# Patient Record
(demographics unavailable — no encounter records)

---

## 2025-01-25 NOTE — ASSESSMENT
[Diabetes Foot Care] : diabetes foot care [Long Term Vascular Complications] : long term vascular complications of diabetes [Carbohydrate Consistent Diet] : carbohydrate consistent diet [Importance of Diet and Exercise] : importance of diet and exercise to improve glycemic control, achieve weight loss and improve cardiovascular health [Exercise/Effect on Glucose] : exercise/effect on glucose [Retinopathy Screening] : Patient was referred to ophthalmology for retinopathy screening [Weight Loss] : weight loss [Diabetic Medications] : Risks and benefits of diabetic medications were discussed [Levothyroxine] : The patient was instructed to take Levothyroxine on an empty stomach, separate from vitamins, and wait at least 30 minutes before eating [FreeTextEntry1] : 63yo F w/ hx of DLBCL, LLUVIA, Obesity, DM2, Graves dx s/p GIBSON w/ post ablative hypothyroidism presenting for follow up visit.  #DM2, controlled  #Hx of Obesity - BMI as high as 35 in the past >> BMI now 26.79 has mild neuropathy symptoms  - off Metformin since March 2024  - Transition from Ozempic 2mg sc weekly to Mounjaro as patient feels Ozempic no longer effective for further weight loss, BMI at 26.79, would like to lose another 10-15lbs and weight loss has plateaued despite continued dietary & lifestyle efforts. Reports no hx of MTC/Pancreatitis, No retinopathy and no gallbladder issues.  - Risks and benefits of incretin based tx/medications were discussed. No hx of pancreatitis, medullary thyroid cancer or MEN syndrome in self or family, No hx of retinopathy. Discussed side effects of GI upset and association with the aforementioned issues.  - Does not have any glucose data with her today - Unable to check A1c in office today as out  of cartridges; check serum A1c along with CMP, Lipids, UACR  - reports AM glucose levels usually 120s or lower. No hypoglycemia.  - SMBG 1-2 x per day, fasting and other times of the day in a staggered manner; keep a log to bring along to next visit - Reviewed with patient BG targets for fasting (80-130mg/dL), premeal (<140mg/dL), 2 hours postprandial (< 180mg/dL) as well as hypoglycemia (<70mg/dL) signs/symptoms/risks & management. - Counseled patient on Carb Consistent Diet - Encouraged Regular Physical Activity as tolerated, aim for 30mins per day/5 days per week.  - Recommended yearly foot exams and home foot precautions. Continue podiatry follow up. UTD  - Recommended at least yearly ophthalmology exams or as recommended by ophtho. UTD, last visit in Feb 2024, no retinopathy per patient.  - dietary/lifestyle changes encouraged   #Postablative Hypothyroidism  #Hx of Graves disease s/p GIBSON  - clinically euthyroid  - check TSH, Free T4 - dose recently adjusted 3 weeks ago - continue LT4 125 mcg daily (dose increased 3 weeks ago by PCP per patient after labs done - does not have available for review), to be taken on an empty stomach at least 1 hour apart from food/other meds, 4hours apart from Ca/Fe/MVI  #HLD LDL goal < 70 LDL 42, HDL 58, , TG 64 in March 2024  - Not on Statin - repeat lipids   #HTN BP goal < 130/80 no prior known nephropathy  - currently on metoprolol succinate 100mg daily & amlodipine 10mg daily, managed by PCP  - UACR wnl in March 2024 - repeat UACR  - not on ACEi/ARB/SGLT2i    RTC in 6 months.     Patient verbalized understanding of recommendations and agrees to plan as detailed above.

## 2025-01-25 NOTE — PHYSICAL EXAM
[Alert] : alert [Well Nourished] : well nourished [No Acute Distress] : no acute distress [Well Developed] : well developed [Normal Voice/Communication] : normal voice communication [Normal Sclera/Conjunctiva] : normal sclera/conjunctiva [EOMI] : extra ocular movement intact [No Proptosis] : no proptosis [Normal Outer Ear/Nose] : the ears and nose were normal in appearance [Normal Hearing] : hearing was normal [Normal Oropharynx] : the oropharynx was normal [Supple] : the neck was supple [Thyroid Not Enlarged] : the thyroid was not enlarged [No Thyroid Nodules] : no palpable thyroid nodules [No Respiratory Distress] : no respiratory distress [No Accessory Muscle Use] : no accessory muscle use [Normal Rate and Effort] : normal respiratory rate and effort [Normal Rate] : heart rate was normal [Regular Rhythm] : with a regular rhythm [No Edema] : no peripheral edema [Pedal Pulses Normal] : the pedal pulses are present [Not Tender] : non-tender [Not Distended] : not distended [Soft] : abdomen soft [Normal Anterior Cervical Nodes] : no anterior cervical lymphadenopathy [No Spinal Tenderness] : no spinal tenderness [Spine Straight] : spine straight [No Stigmata of Cushings Syndrome] : no stigmata of Cushings Syndrome [Normal Gait] : normal gait [No Clubbing, Cyanosis] : no clubbing  or cyanosis of the fingernails [No Joint Swelling] : no joint swelling seen [Normal Strength/Tone] : muscle strength and tone were normal [No Rash] : no rash [No Tremors] : no tremors [Oriented x3] : oriented to person, place, and time [Normal Insight/Judgement] : insight and judgment were intact [Acanthosis Nigricans] : no acanthosis nigricans [Foot Ulcers] : no foot ulcers [Hirsutism] : no hirsutism

## 2025-01-25 NOTE — REVIEW OF SYSTEMS
[Decreased Appetite] : decreased appetite [Dry Skin] : dry skin [Negative] : Psychiatric [Fatigue] : no fatigue [Recent Weight Gain (___ Lbs)] : no recent weight gain [Recent Weight Loss (___ Lbs)] : no recent weight loss [Fever] : no fever [Chills] : no chills [Visual Field Defect] : no visual field defect [Blurred Vision] : no blurred vision [Dysphagia] : no dysphagia [Neck Pain] : no neck pain [Dysphonia] : no dysphonia [Chest Pain] : no chest pain [Palpitations] : no palpitations [Lower Ext Edema] : no lower extremity edema [Shortness Of Breath] : no shortness of breath [Cough] : no cough [Nausea] : no nausea [Constipation] : no constipation [Abdominal Pain] : no abdominal pain [Vomiting] : no vomiting [Diarrhea] : no diarrhea [Polyuria] : no polyuria [Irregular Menses] : regular menses [Hair Loss] : no hair loss [Headaches] : no headaches [Dizziness] : no dizziness [Tremors] : no tremors [Pain/Numbness of Digits] : no pain/numbness of digits [Depression] : no depression [Polydipsia] : no polydipsia [Cold Intolerance] : no cold intolerance [Heat Intolerance] : no heat intolerance [FreeTextEntry6] : not using CPAP regularly, feels mask is bothersome  [FreeTextEntry9] : recent fracture of L. foot

## 2025-01-25 NOTE — REVIEW OF SYSTEMS
[Decreased Appetite] : decreased appetite [Dry Skin] : dry skin [Negative] : Psychiatric [Recent Weight Gain (___ Lbs)] : no recent weight gain [Fatigue] : no fatigue [Recent Weight Loss (___ Lbs)] : no recent weight loss [Fever] : no fever [Chills] : no chills [Visual Field Defect] : no visual field defect [Blurred Vision] : no blurred vision [Dysphagia] : no dysphagia [Neck Pain] : no neck pain [Dysphonia] : no dysphonia [Chest Pain] : no chest pain [Palpitations] : no palpitations [Lower Ext Edema] : no lower extremity edema [Shortness Of Breath] : no shortness of breath [Cough] : no cough [Nausea] : no nausea [Constipation] : no constipation [Abdominal Pain] : no abdominal pain [Vomiting] : no vomiting [Diarrhea] : no diarrhea [Polyuria] : no polyuria [Irregular Menses] : regular menses [Hair Loss] : no hair loss [Headaches] : no headaches [Dizziness] : no dizziness [Tremors] : no tremors [Pain/Numbness of Digits] : no pain/numbness of digits [Depression] : no depression [Polydipsia] : no polydipsia [Cold Intolerance] : no cold intolerance [Heat Intolerance] : no heat intolerance [FreeTextEntry6] : not using CPAP regularly, feels mask is bothersome  [FreeTextEntry9] : recent fracture of L. foot 143.9

## 2025-01-25 NOTE — HISTORY OF PRESENT ILLNESS
[FreeTextEntry1] : 63yo F w/ hx of DLBCL, LLUVIA, Obesity, DM2, Graves dx s/p GIBSON w/ post ablative hypothyroidism presenting for follow up visit.   Former patient of Dr. Tia Allred.  Last visit in July 2024.   JANUARY 2025: Feels weight loss has plateaued on Ozempic 2mg weekly. Would like to transition to Mounjaro. Has a friend who is losing weight on Mounjaro and would like to try this.  Reports her appetite is controlled.  reports recent a1c was reported to be low. Does not have recent labs with her.  reports PCP checked labs 3 weeks ago and also increased levothyroxine to 125mcg daily for the past 3 weeks.  Has been on higher dose for 3 weeks.  Reports she has BMs every 2 days, how she is normally. Feeling very cold lately she says. No hair/skin changes. Energy levels are good. Sleeping okay, sometimes does not sleep well if she has a lot on her mind. Weight down ~ 5-6lbs since last visit. Now trying to exercise more, using elliptical and treadmill.   JULY 2024:  Had a slip/fall on stairs early 2024, resulted in fracture of L. foot. Going to PT now 3x per week. Feels her exercise/activity limited 2/2 to this although foot is now healed well.    ==========================================================================================  THYROID HISTORY:  Graves s/p GIBSON in May 2015: uptake and scan showed 62% uptake. US thyroid showed multinodular goiter. TSH R and TPO Abs positive.  Last U/S 11/16 showed no discrete nodules. Showed 1.7 cm normal appearing R cervical Level 2 nodule. TFTs checked in July 2023: TSH 0.15, FT4 2.0. Had been on LT4 137mcg daily at that time.  Dose was subsequently reduced to 125mcg daily. No recent labs.  TFTs checked after last visit in March 2024 w/ elevated FT4, dose adjusted to LT4 112mcg daily.   DM2: A1c 6.4% June 2020. Was started on metformin 500 mg bid mid 2020. Discontinued in March 2024 when she started Ozempic for added weight loss benefit. Appetite more suppressed since starting ozempic but has not been losing weight.  Currently on   A1c has been as high as 6.7% going back to April 2019.  A1c 6.0% in July 2023 >> 5.9% in March 2024 >> 5.8% in July 2024.  No CAD/CVA. No nephropathy. diet- fruits, a slice of bread, roti/veggies with dinner. Reduced appetite, however, consuming mostly carbs/fruits. No soda/juice. No concentrated sweets.  Was walking before, not much since breaking her foot.  Optho - Feb 2024, had dilated eye exam, reports no retinopathy. Has some mildly increased eye pressure. Has an appt coming up.  Podiatry - following with podiatry, has neuropathy, now completely resolved. No ulcers/wounds.   Obesity - had been on Phentermine 15mg daily from March 2023- Aug 2023; dose increased to 30mg daily in Sept 2023. At last visit BMI 27. Reported she has been experiencing dry mouth with this medication. No appetite, eating very little on this med. Had not been taking it for ~3 weeks at that time as she did not feel it was still working. No palpitations.  After March 2024 visit, patient was started on Ozempic. Dose has been slowly titrated up since that visit.  Ozempic dose was increased to 2mg weekly, first dose was on July 8th. Tolerating well. Has not been losing weight on lower doses. Feels appetite is suppressed and eating less but reports she feels weight loss has been stagnant as her activity levels have decreased significantly. Trying to be more active now.   HTN - metoprolol succinate 100mg daily, amlodipine 10mg daily   has dm2 in family, parents & brothers. no pancreatitis/mtc.  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 , lives with spouse/family.  no tobacco, no drug use.  ETOH once in a while on special occasion.

## 2025-07-12 NOTE — HISTORY OF PRESENT ILLNESS
[FreeTextEntry1] : 65yo F w/ hx of DLBCL, LLUVIA, Obesity, DM2, Graves dx s/p GIBSON w/ post ablative hypothyroidism presenting for follow up visit.   Former patient of Dr. Tia Allred.  Last visit in July 2024.   JULY 2025 wants to increase mounjaro dose, weight loss has plateaued on Mounjaro 2.5mg.  Sugars controlled, not checking consistently. Still has not been exercising much.   JANUARY 2025: Feels weight loss has plateaued on Ozempic 2mg weekly. Would like to transition to Mounjaro. Has a friend who is losing weight on Mounjaro and would like to try this.  Reports her appetite is controlled.  reports recent a1c was reported to be low. Does not have recent labs with her.  reports PCP checked labs 3 weeks ago and also increased levothyroxine to 125mcg daily for the past 3 weeks.  Has been on higher dose for 3 weeks.  Reports she has BMs every 2 days, how she is normally. Feeling very cold lately she says. No hair/skin changes. Energy levels are good. Sleeping okay, sometimes does not sleep well if she has a lot on her mind. Weight down ~ 5-6lbs since last visit. Now trying to exercise more, using elliptical and treadmill.   JULY 2024:  Had a slip/fall on stairs early 2024, resulted in fracture of L. foot. Going to PT now 3x per week. Feels her exercise/activity limited 2/2 to this although foot is now healed well.    ==========================================================================================  THYROID HISTORY:  Graves s/p GIBSON in May 2015: uptake and scan showed 62% uptake. US thyroid showed multinodular goiter. TSH R and TPO Abs positive.  Last U/S 11/16 showed no discrete nodules. Showed 1.7 cm normal appearing R cervical Level 2 nodule. TFTs checked in July 2023: TSH 0.15, FT4 2.0. Had been on LT4 137mcg daily at that time.  Dose was subsequently reduced to 125mcg daily. No recent labs.  TFTs checked  in March 2024 w/ elevated FT4, dose adjusted to LT4 112mcg daily.   DM2: A1c 6.4% June 2020. Was started on metformin 500 mg bid mid 2020. Discontinued in March 2024 when she started Ozempic for added weight loss benefit. Appetite more suppressed since starting ozempic but has not been losing weight. Started on Mounjaro 2.5mg weekly in Jan 2025.   A1c has been as high as 6.7% going back to April 2019.  A1c 6.0% in July 2023 >> 5.9% in March 2024 >> 5.8% in July 2024 >> 5.7% in July 2025   No CAD/CVA. No nephropathy. diet- fruits, a slice of bread, roti/veggies with dinner. Reduced appetite, however, consuming mostly carbs/fruits. No soda/juice. No concentrated sweets.  Was walking before, not much since breaking her foot.  Optho - Feb 2024, had dilated eye exam, reports no retinopathy. Has some mildly increased eye pressure. Has an appt coming up.  Podiatry - following with podiatry, has neuropathy, now completely resolved. No ulcers/wounds.   Obesity - had been on Phentermine 15mg daily from March 2023- Aug 2023; dose increased to 30mg daily in Sept 2023. At last visit BMI 27. Reported she has been experiencing dry mouth with this medication. No appetite, eating very little on this med. Had not been taking it for ~3 weeks at that time as she did not feel it was still working. No palpitations.  After March 2024 visit, patient was started on Ozempic. Dose has been slowly titrated up since that visit.  Ozempic dose was increased to 2mg weekly, first dose was on July 8th. Tolerating well. Has not been losing weight on lower doses. Feels appetite is suppressed and eating less but reports she feels weight loss has been stagnant as her activity levels have decreased significantly. Now on Mounjaro.   HTN - metoprolol succinate 100mg daily, amlodipine 10mg daily   has dm2 in family, parents & brothers. no pancreatitis/mtc.  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 , lives with spouse/family.  no tobacco, no drug use.  ETOH once in a while on special occasion.

## 2025-07-12 NOTE — HISTORY OF PRESENT ILLNESS
[FreeTextEntry1] : 63yo F w/ hx of DLBCL, LLUVIA, Obesity, DM2, Graves dx s/p GIBSON w/ post ablative hypothyroidism presenting for follow up visit.   Former patient of Dr. Tia Allred.  Last visit in July 2024.   JULY 2025 wants to increase mounjaro dose, weight loss has plateaued on Mounjaro 2.5mg.  Sugars controlled, not checking consistently. Still has not been exercising much.   JANUARY 2025: Feels weight loss has plateaued on Ozempic 2mg weekly. Would like to transition to Mounjaro. Has a friend who is losing weight on Mounjaro and would like to try this.  Reports her appetite is controlled.  reports recent a1c was reported to be low. Does not have recent labs with her.  reports PCP checked labs 3 weeks ago and also increased levothyroxine to 125mcg daily for the past 3 weeks.  Has been on higher dose for 3 weeks.  Reports she has BMs every 2 days, how she is normally. Feeling very cold lately she says. No hair/skin changes. Energy levels are good. Sleeping okay, sometimes does not sleep well if she has a lot on her mind. Weight down ~ 5-6lbs since last visit. Now trying to exercise more, using elliptical and treadmill.   JULY 2024:  Had a slip/fall on stairs early 2024, resulted in fracture of L. foot. Going to PT now 3x per week. Feels her exercise/activity limited 2/2 to this although foot is now healed well.    ==========================================================================================  THYROID HISTORY:  Graves s/p GIBSON in May 2015: uptake and scan showed 62% uptake. US thyroid showed multinodular goiter. TSH R and TPO Abs positive.  Last U/S 11/16 showed no discrete nodules. Showed 1.7 cm normal appearing R cervical Level 2 nodule. TFTs checked in July 2023: TSH 0.15, FT4 2.0. Had been on LT4 137mcg daily at that time.  Dose was subsequently reduced to 125mcg daily. No recent labs.  TFTs checked  in March 2024 w/ elevated FT4, dose adjusted to LT4 112mcg daily.   DM2: A1c 6.4% June 2020. Was started on metformin 500 mg bid mid 2020. Discontinued in March 2024 when she started Ozempic for added weight loss benefit. Appetite more suppressed since starting ozempic but has not been losing weight. Started on Mounjaro 2.5mg weekly in Jan 2025.   A1c has been as high as 6.7% going back to April 2019.  A1c 6.0% in July 2023 >> 5.9% in March 2024 >> 5.8% in July 2024 >> 5.7% in July 2025   No CAD/CVA. No nephropathy. diet- fruits, a slice of bread, roti/veggies with dinner. Reduced appetite, however, consuming mostly carbs/fruits. No soda/juice. No concentrated sweets.  Was walking before, not much since breaking her foot.  Optho - Feb 2024, had dilated eye exam, reports no retinopathy. Has some mildly increased eye pressure. Has an appt coming up.  Podiatry - following with podiatry, has neuropathy, now completely resolved. No ulcers/wounds.   Obesity - had been on Phentermine 15mg daily from March 2023- Aug 2023; dose increased to 30mg daily in Sept 2023. At last visit BMI 27. Reported she has been experiencing dry mouth with this medication. No appetite, eating very little on this med. Had not been taking it for ~3 weeks at that time as she did not feel it was still working. No palpitations.  After March 2024 visit, patient was started on Ozempic. Dose has been slowly titrated up since that visit.  Ozempic dose was increased to 2mg weekly, first dose was on July 8th. Tolerating well. Has not been losing weight on lower doses. Feels appetite is suppressed and eating less but reports she feels weight loss has been stagnant as her activity levels have decreased significantly. Now on Mounjaro.   HTN - metoprolol succinate 100mg daily, amlodipine 10mg daily   has dm2 in family, parents & brothers. no pancreatitis/mtc.  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 , lives with spouse/family.  no tobacco, no drug use.  ETOH once in a while on special occasion.

## 2025-07-12 NOTE — ASSESSMENT
[Diabetes Foot Care] : diabetes foot care [Long Term Vascular Complications] : long term vascular complications of diabetes [Carbohydrate Consistent Diet] : carbohydrate consistent diet [Importance of Diet and Exercise] : importance of diet and exercise to improve glycemic control, achieve weight loss and improve cardiovascular health [Exercise/Effect on Glucose] : exercise/effect on glucose [Retinopathy Screening] : Patient was referred to ophthalmology for retinopathy screening [Weight Loss] : weight loss [Diabetic Medications] : Risks and benefits of diabetic medications were discussed [Levothyroxine] : The patient was instructed to take Levothyroxine on an empty stomach, separate from vitamins, and wait at least 30 minutes before eating [FreeTextEntry1] : 63yo F w/ hx of DLBCL, LLUVIA, Obesity, DM2, Graves dx s/p GIBSON w/ post ablative hypothyroidism presenting for follow up visit.  #DM2, controlled  #Hx of Obesity - BMI as high as 35 in the past >> BMI 26.79 >> 28.62 has mild neuropathy symptoms  A1c 5.7% - off Metformin since March 2024; was previously on Ozempic with minimal weight loss, now Mounjaro.  - Increase to Mounjaro 5mg sc weekly. Will request office pharmacist to reach out to patient in 4 weeks to assist with titration. Patient also instructed to call the office in 3-4 weeks if she feels ready to increase dose further to achieve weight loss goals. Reports no hx of MTC/Pancreatitis, No retinopathy and no gallbladder issues.  - Risks and benefits of incretin based tx/medications were discussed. No hx of pancreatitis, medullary thyroid cancer or MEN syndrome in self or family, No hx of retinopathy. Discussed side effects of GI upset and association with the aforementioned issues.  - Does not have any glucose data with her today - reports AM glucose levels usually 120s or lower. No hypoglycemia.  - SMBG 1-2 x per day, fasting and other times of the day in a staggered manner; keep a log to bring along to next visit - Reviewed with patient BG targets for fasting (80-130mg/dL), premeal (<140mg/dL), 2 hours postprandial (< 180mg/dL) as well as hypoglycemia (<70mg/dL) signs/symptoms/risks & management. - Counseled patient on Carb Consistent Diet - Encouraged Regular Physical Activity as tolerated, aim for 30mins per day/5 days per week.  - Recommended yearly foot exams and home foot precautions. Continue podiatry follow up. UTD  - Recommended at least yearly ophthalmology exams or as recommended by ophtho. UTD, last visit in Feb 2024, no retinopathy per patient.  - dietary/lifestyle changes encouraged   #Postablative Hypothyroidism  #Hx of Graves disease s/p GIBSON  - clinically euthyroid  - check TSH, Free T4  - continue LT4 112 mcg daily   #HLD LDL goal < 70 - Not on Statin - lipids at goal in Jan 2025    #HTN BP goal < 130/80 no prior known nephropathy  - currently on metoprolol succinate 100mg daily & amlodipine 10mg daily, managed by PCP  - [facility] wnl in 2025     RTC in 6 months.     Patient verbalized understanding of recommendations and agrees to plan as detailed above.

## 2025-07-12 NOTE — ASSESSMENT
[Diabetes Foot Care] : diabetes foot care [Long Term Vascular Complications] : long term vascular complications of diabetes [Carbohydrate Consistent Diet] : carbohydrate consistent diet [Importance of Diet and Exercise] : importance of diet and exercise to improve glycemic control, achieve weight loss and improve cardiovascular health [Exercise/Effect on Glucose] : exercise/effect on glucose [Retinopathy Screening] : Patient was referred to ophthalmology for retinopathy screening [Weight Loss] : weight loss [Diabetic Medications] : Risks and benefits of diabetic medications were discussed [Levothyroxine] : The patient was instructed to take Levothyroxine on an empty stomach, separate from vitamins, and wait at least 30 minutes before eating [FreeTextEntry1] : 63yo F w/ hx of DLBCL, LLUVIA, Obesity, DM2, Graves dx s/p GIBSON w/ post ablative hypothyroidism presenting for follow up visit.  #DM2, controlled  #Hx of Obesity - BMI as high as 35 in the past >> BMI 26.79 >> 28.62 has mild neuropathy symptoms  A1c 5.7% - off Metformin since March 2024; was previously on Ozempic with minimal weight loss, now Mounjaro.  - Increase to Mounjaro 5mg sc weekly. Will request office pharmacist to reach out to patient in 4 weeks to assist with titration. Patient also instructed to call the office in 3-4 weeks if she feels ready to increase dose further to achieve weight loss goals. Reports no hx of MTC/Pancreatitis, No retinopathy and no gallbladder issues.  - Risks and benefits of incretin based tx/medications were discussed. No hx of pancreatitis, medullary thyroid cancer or MEN syndrome in self or family, No hx of retinopathy. Discussed side effects of GI upset and association with the aforementioned issues.  - Does not have any glucose data with her today - reports AM glucose levels usually 120s or lower. No hypoglycemia.  - SMBG 1-2 x per day, fasting and other times of the day in a staggered manner; keep a log to bring along to next visit - Reviewed with patient BG targets for fasting (80-130mg/dL), premeal (<140mg/dL), 2 hours postprandial (< 180mg/dL) as well as hypoglycemia (<70mg/dL) signs/symptoms/risks & management. - Counseled patient on Carb Consistent Diet - Encouraged Regular Physical Activity as tolerated, aim for 30mins per day/5 days per week.  - Recommended yearly foot exams and home foot precautions. Continue podiatry follow up. UTD  - Recommended at least yearly ophthalmology exams or as recommended by ophtho. UTD, last visit in Feb 2024, no retinopathy per patient.  - dietary/lifestyle changes encouraged   #Postablative Hypothyroidism  #Hx of Graves disease s/p GIBSON  - clinically euthyroid  - check TSH, Free T4  - continue LT4 112 mcg daily   #HLD LDL goal < 70 - Not on Statin - lipids at goal in Jan 2025    #HTN BP goal < 130/80 no prior known nephropathy  - currently on metoprolol succinate 100mg daily & amlodipine 10mg daily, managed by PCP  - [facility] wnl in 2025     RTC in 6 months.     Patient verbalized understanding of recommendations and agrees to plan as detailed above.

## 2025-07-12 NOTE — ASSESSMENT
[Diabetes Foot Care] : diabetes foot care [Long Term Vascular Complications] : long term vascular complications of diabetes [Carbohydrate Consistent Diet] : carbohydrate consistent diet [Importance of Diet and Exercise] : importance of diet and exercise to improve glycemic control, achieve weight loss and improve cardiovascular health [Exercise/Effect on Glucose] : exercise/effect on glucose [Retinopathy Screening] : Patient was referred to ophthalmology for retinopathy screening [Weight Loss] : weight loss [Diabetic Medications] : Risks and benefits of diabetic medications were discussed [Levothyroxine] : The patient was instructed to take Levothyroxine on an empty stomach, separate from vitamins, and wait at least 30 minutes before eating [FreeTextEntry1] : 65yo F w/ hx of DLBCL, LLUVIA, Obesity, DM2, Graves dx s/p GIBSON w/ post ablative hypothyroidism presenting for follow up visit.  #DM2, controlled  #Hx of Obesity - BMI as high as 35 in the past >> BMI 26.79 >> 28.62 has mild neuropathy symptoms  A1c 5.7% - off Metformin since March 2024; was previously on Ozempic with minimal weight loss, now Mounjaro.  - Increase to Mounjaro 5mg sc weekly. Will request office pharmacist to reach out to patient in 4 weeks to assist with titration. Patient also instructed to call the office in 3-4 weeks if she feels ready to increase dose further to achieve weight loss goals. Reports no hx of MTC/Pancreatitis, No retinopathy and no gallbladder issues.  - Risks and benefits of incretin based tx/medications were discussed. No hx of pancreatitis, medullary thyroid cancer or MEN syndrome in self or family, No hx of retinopathy. Discussed side effects of GI upset and association with the aforementioned issues.  - Does not have any glucose data with her today - reports AM glucose levels usually 120s or lower. No hypoglycemia.  - SMBG 1-2 x per day, fasting and other times of the day in a staggered manner; keep a log to bring along to next visit - Reviewed with patient BG targets for fasting (80-130mg/dL), premeal (<140mg/dL), 2 hours postprandial (< 180mg/dL) as well as hypoglycemia (<70mg/dL) signs/symptoms/risks & management. - Counseled patient on Carb Consistent Diet - Encouraged Regular Physical Activity as tolerated, aim for 30mins per day/5 days per week.  - Recommended yearly foot exams and home foot precautions. Continue podiatry follow up. UTD  - Recommended at least yearly ophthalmology exams or as recommended by ophtho. UTD, last visit in Feb 2024, no retinopathy per patient.  - dietary/lifestyle changes encouraged   #Postablative Hypothyroidism  #Hx of Graves disease s/p GIBSON  - clinically euthyroid  - check TSH, Free T4  - continue LT4 112 mcg daily   #HLD LDL goal < 70 - Not on Statin - lipids at goal in Jan 2025    #HTN BP goal < 130/80 no prior known nephropathy  - currently on metoprolol succinate 100mg daily & amlodipine 10mg daily, managed by PCP  - [facility] wnl in 2025     RTC in 6 months.     Patient verbalized understanding of recommendations and agrees to plan as detailed above.